# Patient Record
Sex: MALE | URBAN - METROPOLITAN AREA
[De-identification: names, ages, dates, MRNs, and addresses within clinical notes are randomized per-mention and may not be internally consistent; named-entity substitution may affect disease eponyms.]

---

## 2020-02-27 ENCOUNTER — APPOINTMENT (OUTPATIENT)
Age: 40
Setting detail: DERMATOLOGY
End: 2020-02-28

## 2020-02-27 DIAGNOSIS — D22 MELANOCYTIC NEVI: ICD-10-CM

## 2020-02-27 DIAGNOSIS — L57.8 OTHER SKIN CHANGES DUE TO CHRONIC EXPOSURE TO NONIONIZING RADIATION: ICD-10-CM

## 2020-02-27 DIAGNOSIS — L91.8 OTHER HYPERTROPHIC DISORDERS OF THE SKIN: ICD-10-CM

## 2020-02-27 DIAGNOSIS — L81.4 OTHER MELANIN HYPERPIGMENTATION: ICD-10-CM

## 2020-02-27 PROBLEM — D22.39 MELANOCYTIC NEVI OF OTHER PARTS OF FACE: Status: ACTIVE | Noted: 2020-02-27

## 2020-02-27 PROCEDURE — 99203 OFFICE O/P NEW LOW 30 MIN: CPT | Mod: 25

## 2020-02-27 PROCEDURE — OTHER COUNSELING: OTHER

## 2020-02-27 PROCEDURE — OTHER LIQUID NITROGEN: OTHER

## 2020-02-27 PROCEDURE — 11200 RMVL SKIN TAGS UP TO&INC 15: CPT

## 2020-02-27 PROCEDURE — OTHER SKIN TAG REMOVAL: OTHER

## 2020-02-27 ASSESSMENT — LOCATION ZONE DERM
LOCATION ZONE: LEG
LOCATION ZONE: FACE

## 2020-02-27 ASSESSMENT — LOCATION DETAILED DESCRIPTION DERM
LOCATION DETAILED: LEFT MEDIAL ZYGOMA
LOCATION DETAILED: RIGHT MEDIAL FOREHEAD
LOCATION DETAILED: RIGHT ANTERIOR MEDIAL PROXIMAL THIGH

## 2020-02-27 ASSESSMENT — LOCATION SIMPLE DESCRIPTION DERM
LOCATION SIMPLE: LEFT ZYGOMA
LOCATION SIMPLE: RIGHT THIGH
LOCATION SIMPLE: RIGHT FOREHEAD

## 2020-02-27 NOTE — PROCEDURE: SKIN TAG REMOVAL
Consent: Written consent obtained and the risks of skin tag removal was reviewed with the patient including but not limited to bleeding, pigmentary change, infection, pain, and remote possibility of scarring.
Anesthesia Type: 1% lidocaine with epinephrine
Medical Necessity Clause: This procedure was medically necessary because the lesions that were treated were:
Anesthesia Volume In Cc: 3
Add Associated Diagnoses If Applicable When Selecting Medical Necessity: Yes
Detail Level: Detailed
Medical Necessity Information: It is in your best interest to select a reason for this procedure from the list below. All of these items fulfill various CMS LCD requirements except the new and changing color options.
Include Z78.9 (Other Specified Conditions Influencing Health Status) As An Associated Diagnosis?: No

## 2020-02-27 NOTE — PROCEDURE: LIQUID NITROGEN
Add 52 Modifier (Optional): no
Detail Level: Zone
Number Of Freeze-Thaw Cycles: 1 freeze-thaw cycle
Duration Of Freeze Thaw-Cycle (Seconds): 5-10
Medical Necessity Information: It is in your best interest to select a reason for this procedure from the list below. All of these items fulfill various CMS LCD requirements except the new and changing color options.
Medical Necessity Clause: This procedure was medically necessary because the lesions that were treated were:
Consent: The patient's consent was obtained including but not limited to risks of crusting, scabbing, blistering, scarring, darker or lighter pigmentary change, recurrence, incomplete removal and infection.
Post-Care Instructions: I reviewed with the patient in detail post-care instructions. Patient is to wear sunprotection, and avoid picking at any of the treated lesions. Pt may apply Vaseline to crusted or scabbing areas.
Total Number Of Lesions Treated: 3

## 2023-11-22 ENCOUNTER — OFFICE VISIT (OUTPATIENT)
Dept: SURGERY | Facility: CLINIC | Age: 43
End: 2023-11-22
Payer: COMMERCIAL

## 2023-11-22 ENCOUNTER — TELEPHONE (OUTPATIENT)
Dept: SURGERY | Facility: CLINIC | Age: 43
End: 2023-11-22

## 2023-11-22 VITALS
BODY MASS INDEX: 31.14 KG/M2 | SYSTOLIC BLOOD PRESSURE: 120 MMHG | HEIGHT: 73 IN | DIASTOLIC BLOOD PRESSURE: 80 MMHG | HEART RATE: 90 BPM | WEIGHT: 235 LBS

## 2023-11-22 DIAGNOSIS — L98.8: Primary | ICD-10-CM

## 2023-11-22 PROCEDURE — 99214 OFFICE O/P EST MOD 30 MIN: CPT | Performed by: SURGERY

## 2023-11-22 NOTE — PROGRESS NOTES
"Surgery Consultation, [facility]         Parker Hare MD    Anupam Young MRN# [id number]   YOB: 1980 Age: 43 year old     PCP:  Fausto Aguilera [phone]    Chief Complaint:  Multiple lipomas    Pt was seen in consultation from Fausto Aguilera.    History of Present Illness:  Anupam Young is a 43 year old male who I actually met at my last practice at [facility].  I saw him two years ago and we planned to remove his many lipomas.  For multiple reasons he never made it to the operating room.  He has probably about 50 lipomas.  He has had them removed by dermatologists under local in the past and wasn't happy with that process.   He would like as many removed as possible    PMH:  Anupam Young   Past Medical History:   Diagnosis Date    Elevated cholesterol       PSH:  Anupam Young has never had surgery    Home medications and allergies reviewed.    Social History:  Anupam Young  reports current alcohol use. He reports that he does not use drugs.  Family History:  Anupam Young family history is not on file.    ROS:  The 10 point Review of Systems is negative other than noted in the HPI.      Physical Exam:  Blood pressure 120/80, pulse 90, height 1.854 m (6' 1\"), weight 106.6 kg (235 lb).  235 lbs 0 oz  Patient has a pleasant affect and communicates well.   Pupils equal round and reactive to light.   No cervical lymphadenopathy or thyromegaly.   Lung fields clear, breathing comfortably.   Heart normal sinus rhythm.  No murmurs rubs or gallops.  Abdomen soft, nontender, nondistended.  Skin: Multiple lipomas all down both arms, legs and anterior thorax.  Two larger ones on his posterior side: One just left of midline above his pelvis, and a second on the back of his thigh.  These are both a little tender to palpation    All new lab and imaging data was reviewed.      Assessment/plan:  Multiple lipomas.  1) He has so many lipomas this not possible to do under local as he " would need much more than the toxic dose of lidocaine.    2) The plan would be to put him lateral on his right side and remove the two posterior lipomas.  We would then position him supine, prepping the arms and remove as many as we can.  Given the number, which is probably about 30, this will take about  minutes.  And will be an outpatient procedure.  3)  I discussed this procedure with him.  I discussed the pre-operative, elier-operative and post-operative course of this procedure.  Main risk would be infection, bleeding or the wounds opening.  Another risk we discussed if one of these lipomas came back as sarcoma, given that we are removing so many it may be difficult to tell where it came from as we will have trouble coordinating the locations of the anterior lipomas with pathology.  If any look to be more solid and less fat like, we will do due diligence to know where those came from.   Surgical co-morbities include None.    Parker Hare M.D.  [facility]  [phone]    Please route or send letter to:  Primary Care Provider (PCP) and Referring Provider

## 2023-11-28 ENCOUNTER — TELEPHONE (OUTPATIENT)
Dept: SURGERY | Facility: CLINIC | Age: 43
End: 2023-11-28
Payer: COMMERCIAL

## 2023-11-28 NOTE — TELEPHONE ENCOUNTER
Name of caller: Patient    Reason for Call:  pt scheduled for multiple lipoma removal, supposed to olesya lipomas that bother him.  Dr Hare said only going for 2 big ones on back and back of leg and to olesya on front the ones that bother him.  He would like to clarify with Dr Hare, he has a lot of lipomas on the back of his legs he thought more could be taken off.    Surgeon:  Dr. Hare    Recent Surgery:  No    If yes, when & what type:  N/A      Best phone number to reach pt at is: [phone]  Ok to leave a message with medical info? Yes.    Pharmacy preferred (if calling for a refill): na

## 2023-11-29 NOTE — TELEPHONE ENCOUNTER
Discussed with him about his additional lipomas on the back of his legs.  I discussed that we were trying to avoid flipping him prone.  But if they bother him enough that he is willing to take the risk we can proceed.   I talked about pressure injuries from going prone.      He will olesya them on his legs and we will discuss things pre-operatively and decide at the time on his positioning in the operating room with anesthesias and the room RN's input.    Parker Hare M.D., F.A.C.S.  [facility]  [facility]  [place], [place]

## 2023-12-05 ENCOUNTER — ANESTHESIA (OUTPATIENT)
Dept: SURGERY | Facility: CLINIC | Age: 43
End: 2023-12-05
Payer: COMMERCIAL

## 2023-12-05 ENCOUNTER — APPOINTMENT (OUTPATIENT)
Dept: SURGERY | Facility: PHYSICIAN GROUP | Age: 43
End: 2023-12-05
Payer: COMMERCIAL

## 2023-12-05 ENCOUNTER — ANESTHESIA EVENT (OUTPATIENT)
Dept: SURGERY | Facility: CLINIC | Age: 43
End: 2023-12-05
Payer: COMMERCIAL

## 2023-12-05 ENCOUNTER — HOSPITAL ENCOUNTER (OUTPATIENT)
Facility: CLINIC | Age: 43
Discharge: HOME | End: 2023-12-05
Attending: SURGERY | Admitting: SURGERY
Payer: COMMERCIAL

## 2023-12-05 VITALS
OXYGEN SATURATION: 95 % | SYSTOLIC BLOOD PRESSURE: 154 MMHG | BODY MASS INDEX: 34.96 KG/M2 | DIASTOLIC BLOOD PRESSURE: 96 MMHG | HEIGHT: 73 IN | RESPIRATION RATE: 16 BRPM | WEIGHT: 263.8 LBS | TEMPERATURE: 97.9 F | HEART RATE: 74 BPM

## 2023-12-05 DIAGNOSIS — G89.18 ACUTE POST-OPERATIVE PAIN: Primary | ICD-10-CM

## 2023-12-05 DIAGNOSIS — L98.8: ICD-10-CM

## 2023-12-05 PROCEDURE — 88304 TISSUE EXAM BY PATHOLOGIST: CPT | Mod: TC | Performed by: SURGERY

## 2023-12-05 PROCEDURE — 710N000012 HC RECOVERY PHASE 2, PER MINUTE: Performed by: SURGERY

## 2023-12-05 PROCEDURE — 250N000009 HC RX 250: Performed by: SURGERY

## 2023-12-05 PROCEDURE — 27337 EXC THIGH/KNEE LES SC 3 CM/>: CPT | Mod: RT | Performed by: SURGERY

## 2023-12-05 PROCEDURE — 22903 EXC ABD LES SC 3 CM/>: CPT | Mod: 51 | Performed by: SURGERY

## 2023-12-05 PROCEDURE — 250N000011 HC RX IP 250 OP 636: Performed by: SURGERY

## 2023-12-05 PROCEDURE — 250N000009 HC RX 250: Performed by: ANESTHESIOLOGY

## 2023-12-05 PROCEDURE — 21930 EXC BACK LES SC < 3 CM: CPT | Mod: 59 | Performed by: SURGERY

## 2023-12-05 PROCEDURE — 88304 TISSUE EXAM BY PATHOLOGIST: CPT | Mod: 26 | Performed by: PATHOLOGY

## 2023-12-05 PROCEDURE — 22902 EXC ABD LES SC < 3 CM: CPT | Mod: AS | Performed by: PHYSICIAN ASSISTANT

## 2023-12-05 PROCEDURE — 25071 EXC FOREARM LES SC 3 CM/>: CPT | Mod: AS | Performed by: PHYSICIAN ASSISTANT

## 2023-12-05 PROCEDURE — 250N000011 HC RX IP 250 OP 636: Performed by: ANESTHESIOLOGY

## 2023-12-05 PROCEDURE — 27327 EXC THIGH/KNEE LES SC < 3 CM: CPT | Mod: RT | Performed by: SURGERY

## 2023-12-05 PROCEDURE — 21931 EXC BACK LES SC 3 CM/>: CPT | Mod: 51 | Performed by: SURGERY

## 2023-12-05 PROCEDURE — 272N000001 HC OR GENERAL SUPPLY STERILE: Performed by: SURGERY

## 2023-12-05 PROCEDURE — 258N000003 HC RX IP 258 OP 636: Performed by: ANESTHESIOLOGY

## 2023-12-05 PROCEDURE — 250N000025 HC SEVOFLURANE, PER MIN: Performed by: SURGERY

## 2023-12-05 PROCEDURE — 22902 EXC ABD LES SC < 3 CM: CPT | Mod: 59 | Performed by: SURGERY

## 2023-12-05 PROCEDURE — 25075 EXC FOREARM LES SC < 3 CM: CPT | Mod: RT | Performed by: SURGERY

## 2023-12-05 PROCEDURE — 999N000141 HC STATISTIC PRE-PROCEDURE NURSING ASSESSMENT: Performed by: SURGERY

## 2023-12-05 PROCEDURE — 22903 EXC ABD LES SC 3 CM/>: CPT | Mod: AS | Performed by: PHYSICIAN ASSISTANT

## 2023-12-05 PROCEDURE — 250N000011 HC RX IP 250 OP 636: Mod: JZ | Performed by: ANESTHESIOLOGY

## 2023-12-05 PROCEDURE — 24075 EXC ARM/ELBOW LES SC < 3 CM: CPT | Mod: RT | Performed by: SURGERY

## 2023-12-05 PROCEDURE — 258N000003 HC RX IP 258 OP 636: Performed by: NURSE ANESTHETIST, CERTIFIED REGISTERED

## 2023-12-05 PROCEDURE — 27337 EXC THIGH/KNEE LES SC 3 CM/>: CPT | Mod: AS | Performed by: PHYSICIAN ASSISTANT

## 2023-12-05 PROCEDURE — 25071 EXC FOREARM LES SC 3 CM/>: CPT | Mod: LT | Performed by: SURGERY

## 2023-12-05 PROCEDURE — 360N000075 HC SURGERY LEVEL 2, PER MIN: Performed by: SURGERY

## 2023-12-05 PROCEDURE — 370N000017 HC ANESTHESIA TECHNICAL FEE, PER MIN: Performed by: SURGERY

## 2023-12-05 PROCEDURE — 710N000010 HC RECOVERY PHASE 1, LEVEL 2, PER MIN: Performed by: SURGERY

## 2023-12-05 PROCEDURE — 27043 EXC HIP PELVIS LES SC 3 CM/>: CPT | Mod: RT | Performed by: SURGERY

## 2023-12-05 PROCEDURE — 21931 EXC BACK LES SC 3 CM/>: CPT | Mod: AS | Performed by: PHYSICIAN ASSISTANT

## 2023-12-05 PROCEDURE — 250N000013 HC RX MED GY IP 250 OP 250 PS 637: Performed by: PHYSICIAN ASSISTANT

## 2023-12-05 RX ORDER — PROPOFOL 10 MG/ML
INJECTION, EMULSION INTRAVENOUS PRN
Status: DISCONTINUED | OUTPATIENT
Start: 2023-12-05 | End: 2023-12-05

## 2023-12-05 RX ORDER — SODIUM CHLORIDE, SODIUM LACTATE, POTASSIUM CHLORIDE, CALCIUM CHLORIDE 600; 310; 30; 20 MG/100ML; MG/100ML; MG/100ML; MG/100ML
INJECTION, SOLUTION INTRAVENOUS CONTINUOUS PRN
Status: DISCONTINUED | OUTPATIENT
Start: 2023-12-05 | End: 2023-12-05

## 2023-12-05 RX ORDER — PROPOFOL 10 MG/ML
INJECTION, EMULSION INTRAVENOUS CONTINUOUS PRN
Status: DISCONTINUED | OUTPATIENT
Start: 2023-12-05 | End: 2023-12-05

## 2023-12-05 RX ORDER — SODIUM CHLORIDE, SODIUM LACTATE, POTASSIUM CHLORIDE, CALCIUM CHLORIDE 600; 310; 30; 20 MG/100ML; MG/100ML; MG/100ML; MG/100ML
INJECTION, SOLUTION INTRAVENOUS CONTINUOUS
Status: DISCONTINUED | OUTPATIENT
Start: 2023-12-05 | End: 2023-12-05 | Stop reason: HOSPADM

## 2023-12-05 RX ORDER — HYDROMORPHONE HCL IN WATER/PF 6 MG/30 ML
0.4 PATIENT CONTROLLED ANALGESIA SYRINGE INTRAVENOUS EVERY 5 MIN PRN
Status: DISCONTINUED | OUTPATIENT
Start: 2023-12-05 | End: 2023-12-05 | Stop reason: HOSPADM

## 2023-12-05 RX ORDER — DEXMEDETOMIDINE HYDROCHLORIDE 4 UG/ML
INJECTION, SOLUTION INTRAVENOUS PRN
Status: DISCONTINUED | OUTPATIENT
Start: 2023-12-05 | End: 2023-12-05

## 2023-12-05 RX ORDER — BUPIVACAINE HYDROCHLORIDE AND EPINEPHRINE 2.5; 5 MG/ML; UG/ML
INJECTION, SOLUTION INFILTRATION; PERINEURAL PRN
Status: DISCONTINUED | OUTPATIENT
Start: 2023-12-05 | End: 2023-12-05 | Stop reason: HOSPADM

## 2023-12-05 RX ORDER — FENTANYL CITRATE 50 UG/ML
INJECTION, SOLUTION INTRAMUSCULAR; INTRAVENOUS PRN
Status: DISCONTINUED | OUTPATIENT
Start: 2023-12-05 | End: 2023-12-05

## 2023-12-05 RX ORDER — FENTANYL CITRATE 0.05 MG/ML
50 INJECTION, SOLUTION INTRAMUSCULAR; INTRAVENOUS EVERY 5 MIN PRN
Status: DISCONTINUED | OUTPATIENT
Start: 2023-12-05 | End: 2023-12-05 | Stop reason: HOSPADM

## 2023-12-05 RX ORDER — ONDANSETRON 4 MG/1
4 TABLET, ORALLY DISINTEGRATING ORAL EVERY 30 MIN PRN
Status: DISCONTINUED | OUTPATIENT
Start: 2023-12-05 | End: 2023-12-05 | Stop reason: HOSPADM

## 2023-12-05 RX ORDER — OXYCODONE HYDROCHLORIDE 5 MG/1
5 TABLET ORAL
Status: COMPLETED | OUTPATIENT
Start: 2023-12-05 | End: 2023-12-05

## 2023-12-05 RX ORDER — BUPIVACAINE HYDROCHLORIDE 2.5 MG/ML
INJECTION, SOLUTION EPIDURAL; INFILTRATION; INTRACAUDAL
Status: DISCONTINUED
Start: 2023-12-05 | End: 2023-12-05 | Stop reason: HOSPADM

## 2023-12-05 RX ORDER — DEXAMETHASONE SODIUM PHOSPHATE 4 MG/ML
INJECTION, SOLUTION INTRA-ARTICULAR; INTRALESIONAL; INTRAMUSCULAR; INTRAVENOUS; SOFT TISSUE PRN
Status: DISCONTINUED | OUTPATIENT
Start: 2023-12-05 | End: 2023-12-05

## 2023-12-05 RX ORDER — HYDROMORPHONE HCL IN WATER/PF 6 MG/30 ML
0.2 PATIENT CONTROLLED ANALGESIA SYRINGE INTRAVENOUS EVERY 5 MIN PRN
Status: DISCONTINUED | OUTPATIENT
Start: 2023-12-05 | End: 2023-12-05 | Stop reason: HOSPADM

## 2023-12-05 RX ORDER — MAGNESIUM HYDROXIDE 1200 MG/15ML
LIQUID ORAL PRN
Status: DISCONTINUED | OUTPATIENT
Start: 2023-12-05 | End: 2023-12-05 | Stop reason: HOSPADM

## 2023-12-05 RX ORDER — FENTANYL CITRATE 0.05 MG/ML
25 INJECTION, SOLUTION INTRAMUSCULAR; INTRAVENOUS EVERY 5 MIN PRN
Status: DISCONTINUED | OUTPATIENT
Start: 2023-12-05 | End: 2023-12-05 | Stop reason: HOSPADM

## 2023-12-05 RX ORDER — ONDANSETRON 2 MG/ML
4 INJECTION INTRAMUSCULAR; INTRAVENOUS EVERY 30 MIN PRN
Status: DISCONTINUED | OUTPATIENT
Start: 2023-12-05 | End: 2023-12-05 | Stop reason: HOSPADM

## 2023-12-05 RX ORDER — CEFAZOLIN SODIUM/WATER 2 G/20 ML
2 SYRINGE (ML) INTRAVENOUS
Status: COMPLETED | OUTPATIENT
Start: 2023-12-05 | End: 2023-12-05

## 2023-12-05 RX ORDER — CEFAZOLIN SODIUM/WATER 2 G/20 ML
2 SYRINGE (ML) INTRAVENOUS SEE ADMIN INSTRUCTIONS
Status: DISCONTINUED | OUTPATIENT
Start: 2023-12-05 | End: 2023-12-05 | Stop reason: HOSPADM

## 2023-12-05 RX ORDER — AMOXICILLIN 250 MG
1-2 CAPSULE ORAL 2 TIMES DAILY
Qty: 15 TABLET | Refills: 0 | Status: SHIPPED | OUTPATIENT
Start: 2023-12-05 | End: 2023-12-21

## 2023-12-05 RX ORDER — ONDANSETRON 2 MG/ML
INJECTION INTRAMUSCULAR; INTRAVENOUS PRN
Status: DISCONTINUED | OUTPATIENT
Start: 2023-12-05 | End: 2023-12-05

## 2023-12-05 RX ORDER — OXYCODONE HYDROCHLORIDE 5 MG/1
5 TABLET ORAL EVERY 4 HOURS PRN
Qty: 12 TABLET | Refills: 0 | Status: SHIPPED | OUTPATIENT
Start: 2023-12-05 | End: 2023-12-21

## 2023-12-05 RX ORDER — KETOROLAC TROMETHAMINE 30 MG/ML
INJECTION, SOLUTION INTRAMUSCULAR; INTRAVENOUS PRN
Status: DISCONTINUED | OUTPATIENT
Start: 2023-12-05 | End: 2023-12-05

## 2023-12-05 RX ORDER — LIDOCAINE HYDROCHLORIDE 20 MG/ML
INJECTION, SOLUTION INFILTRATION; PERINEURAL PRN
Status: DISCONTINUED | OUTPATIENT
Start: 2023-12-05 | End: 2023-12-05

## 2023-12-05 RX ORDER — EPINEPHRINE 1 MG/ML
INJECTION, SOLUTION INTRAMUSCULAR; SUBCUTANEOUS
Status: DISCONTINUED
Start: 2023-12-05 | End: 2023-12-05 | Stop reason: HOSPADM

## 2023-12-05 RX ADMIN — Medication 2 G: at 08:17

## 2023-12-05 RX ADMIN — DEXAMETHASONE SODIUM PHOSPHATE 4 MG: 4 INJECTION, SOLUTION INTRA-ARTICULAR; INTRALESIONAL; INTRAMUSCULAR; INTRAVENOUS; SOFT TISSUE at 08:30

## 2023-12-05 RX ADMIN — SODIUM CHLORIDE, POTASSIUM CHLORIDE, SODIUM LACTATE AND CALCIUM CHLORIDE: 600; 310; 30; 20 INJECTION, SOLUTION INTRAVENOUS at 10:38

## 2023-12-05 RX ADMIN — PROPOFOL 40 MCG/KG/MIN: 10 INJECTION, EMULSION INTRAVENOUS at 08:42

## 2023-12-05 RX ADMIN — DEXMEDETOMIDINE HYDROCHLORIDE 8 MCG: 200 INJECTION INTRAVENOUS at 08:37

## 2023-12-05 RX ADMIN — SUGAMMADEX 200 MG: 100 INJECTION, SOLUTION INTRAVENOUS at 10:59

## 2023-12-05 RX ADMIN — ROCURONIUM BROMIDE 50 MG: 50 INJECTION, SOLUTION INTRAVENOUS at 08:21

## 2023-12-05 RX ADMIN — LIDOCAINE HYDROCHLORIDE 60 MG: 20 INJECTION, SOLUTION INFILTRATION; PERINEURAL at 08:20

## 2023-12-05 RX ADMIN — ROCURONIUM BROMIDE 20 MG: 50 INJECTION, SOLUTION INTRAVENOUS at 08:45

## 2023-12-05 RX ADMIN — FENTANYL CITRATE 50 MCG: 50 INJECTION INTRAMUSCULAR; INTRAVENOUS at 08:52

## 2023-12-05 RX ADMIN — DEXMEDETOMIDINE HYDROCHLORIDE 8 MCG: 200 INJECTION INTRAVENOUS at 08:30

## 2023-12-05 RX ADMIN — FENTANYL CITRATE 50 MCG: 50 INJECTION, SOLUTION INTRAMUSCULAR; INTRAVENOUS at 11:32

## 2023-12-05 RX ADMIN — KETOROLAC TROMETHAMINE 30 MG: 30 INJECTION, SOLUTION INTRAMUSCULAR at 10:47

## 2023-12-05 RX ADMIN — FENTANYL CITRATE 50 MCG: 50 INJECTION INTRAMUSCULAR; INTRAVENOUS at 08:20

## 2023-12-05 RX ADMIN — MIDAZOLAM 2 MG: 1 INJECTION INTRAMUSCULAR; INTRAVENOUS at 08:14

## 2023-12-05 RX ADMIN — SODIUM CHLORIDE, POTASSIUM CHLORIDE, SODIUM LACTATE AND CALCIUM CHLORIDE: 600; 310; 30; 20 INJECTION, SOLUTION INTRAVENOUS at 08:13

## 2023-12-05 RX ADMIN — PROPOFOL 300 MG: 10 INJECTION, EMULSION INTRAVENOUS at 08:20

## 2023-12-05 RX ADMIN — OXYCODONE HYDROCHLORIDE 5 MG: 5 TABLET ORAL at 11:54

## 2023-12-05 RX ADMIN — PHENYLEPHRINE HYDROCHLORIDE 100 MCG: 10 INJECTION INTRAVENOUS at 09:53

## 2023-12-05 RX ADMIN — DEXMEDETOMIDINE HYDROCHLORIDE 4 MCG: 200 INJECTION INTRAVENOUS at 08:56

## 2023-12-05 RX ADMIN — ONDANSETRON 4 MG: 2 INJECTION INTRAMUSCULAR; INTRAVENOUS at 10:35

## 2023-12-05 ASSESSMENT — ACTIVITIES OF DAILY LIVING (ADL)
ADLS_ACUITY_SCORE: 18

## 2023-12-05 NOTE — OR NURSING
VSS, has tolerable pain per pt response. Dischare instructions reviewed w/wife, verbal understanding given to RN. Pt transferred to door 1.

## 2023-12-05 NOTE — DISCHARGE INSTRUCTIONS
[facility]  Discharge Instructions: Post-Operative Excision of Mass or Lesion    ACTIVITY  Take frequent, short walks and increase your activity gradually.    Avoid strenuous physical activity or heavy lifting greater than 15-20 lbs. for 1-2 weeks.  You may climb stairs.  You may drive without restrictions when you are not using any prescription pain medication and feel comfortable in a car.  You may return to work/school when you are comfortable without any prescription pain medication.    WOUND CARE  You may remove your outer dressing or Band-Aids and shower 48 hours after the surgery.  Pat your incisions dry and leave them open to air.  Re-apply dressing (Band-Aids or gauze/tape) as needed for comfort or drainage.  You may have steri-strips (looks like white tape) or skin glue on your incisions.  You may peel off the steri-strips 2 weeks after your surgery if they have not peeled off on their own.  If you have skin glue, it will peel up and fall off on its own.  Do not soak your incisions in a tub or pool for 2 weeks.   Do not apply any lotions, creams, or ointments to your incision(s).  A ridge under your incision(s) is normal and will gradually resolve.    DIET  Start with liquids, then gradually resume your regular diet as tolerated.   Drink plenty of liquids to stay hydrated.    PAIN  Expect some tenderness and discomfort at the incision site(s).  Use the prescribed pain medication at your discretion.  Expect gradual resolution of your pain over several days.  You may take ibuprofen with food (unless you have been told not to) or acetaminophen/Tylenol instead of or in addition to your prescribed pain medication.  However, if you are taking Norco or Percocet, do not take any additional acetaminophen/Tylenol.  Do not drink alcohol or drive while you are taking pain medications.  You may apply ice to your incisions in 20 minute intervals as needed for the next 48 hours.  After that  time, consider switching to heat if you prefer.    EXPECTATIONS  Pain medications can cause constipation.  Limit use when possible.  Take an over the counter or prescribed stool softener/stimulant, such as Colace or Senna, 1-2 times a day with plenty of water.  You may take a mild over the counter laxative, such as Miralax or a suppository, as needed.    You may discontinue these medications once you are having regular bowel movements and/or are no longer taking your narcotic pain medication.    FOLLOW UP  Follow up with Dr. Hare in 2-3 weeks. Call [phone] to schedule an appointment or if you have any concerns. We are located at [address] [place] [address].     CALL OUR OFFICE -[phone] IF YOU HAVE:   Chills or fever above 101 F.  Increased redness, warmth, or drainage at your incisions.  Significant bleeding.  Pain not relieved by your pain medication or rest.  Increasing pain after the first 48 hours.  Any other concerns or questions.                   Same Day Surgery Discharge Instructions for  Sedation and General Anesthesia     It's not unusual to feel dizzy, light-headed or faint for up to 24 hours after surgery or while taking pain medication.  If you have these symptoms: sit for a few minutes before standing and have someone assist you when you get up to walk or use the bathroom.    You should rest and relax for the next 24 hours. We recommend you make arrangements to have an adult stay with you for at least 24 hours after your discharge.  Avoid hazardous and strenuous activity.    DO NOT DRIVE any vehicle or operate mechanical equipment for 24 hours following the end of your surgery.  Even though you may feel normal, your reactions may be affected by the medication you have received.    Do not drink alcoholic beverages for 24 hours following surgery.     Slowly progress to your regular diet as you feel able. It's not unusual to feel nauseated and/or vomit after  receiving anesthesia.  If you develop these symptoms, drink clear liquids (apple juice, ginger ale, broth, 7-up, etc. ) until you feel better.  If your nausea and vomiting persists for 24 hours, please notify your surgeon.      All narcotic pain medications, along with inactivity and anesthesia, can cause constipation. Drinking plenty of liquids and increasing fiber intake will help.    For any questions of a medical nature, call your surgeon.    Do not make important decisions for 24 hours.    If you had general anesthesia, you may have a sore throat for a couple of days related to the breathing tube used during surgery.  You may use Cepacol lozenges to help with this discomfort.  If it worsens or if you develop a fever, contact your surgeon.     If you feel your pain is not well managed with the pain medications prescribed by your surgeon, please contact your surgeon's office to let them know so they can address your concerns.          Today you received Toradol, an antiinflammatory medication similar to Ibuprofen.  You should not take other antiinflammatory medication, such as Ibuprofen, Motrin, Advil, Aleve, Naprosyn, etc until 5 pm.    **If you have concerns or questions about your procedure,    please contact Dr Hare at  [phone]**            Revised December 2021

## 2023-12-05 NOTE — ANESTHESIA POSTPROCEDURE EVALUATION
Patient: Anupam Young    Procedure: Procedure(s):  Excision multiple lipomas (30+, two on back, multiple on extremities and front       Anesthesia Type:  General    Note:     Postop Pain Control: Uneventful            Sign Out: Well controlled pain   PONV: No   Neuro/Psych: Uneventful            Sign Out: Acceptable/Baseline neuro status   Airway/Respiratory: Uneventful            Sign Out: Acceptable/Baseline resp. status   CV/Hemodynamics: Uneventful            Sign Out: Acceptable CV status; No obvious hypovolemia; No obvious fluid overload   Other NRE: NONE   DID A NON-ROUTINE EVENT OCCUR?            Last vitals:  Vitals Value Taken Time   /96 12/05/23 1200   Temp 36.6  C (97.9  F) 12/05/23 1200   Pulse 71 12/05/23 1206   Resp 16 12/05/23 1206   SpO2 95 % 12/05/23 1206   Vitals shown include unfiled device data.    Electronically Signed By: Jared Sierra DO,   December 5, 2023  2:36 PM

## 2023-12-05 NOTE — BRIEF OP NOTE
[facility]    Brief Operative Note    Pre-operative diagnosis: Mass of subcutaneous tissue of multiple sites [M79.89]  Post-operative diagnosis Same as pre-operative diagnosis    Procedure: Excision multiple lipomas (30+, two on back, multiple on extremities and front, N/A - Trunk    Surgeon: Surgeon(s) and Role:     * Parker Hare MD - Primary     * Mazin Montana PA-C - Assisting     * Kathie Arechiga PA-C - Assisting     * Joanna Hudson PA-C - Assisting  Anesthesia: General   Estimated Blood Loss: 15cc    Drains: None  Specimens:   ID Type Source Tests Collected by Time Destination   1 : MID BACK LIPOMA X2 Tissue Back, Upper SURGICAL PATHOLOGY EXAM Parker Hare MD 12/5/2023  9:00 AM    2 : LEFT POSTERIOR ARM LIPOMAS X 2 Tissue Arm, Left SURGICAL PATHOLOGY EXAM Parker Hare MD 12/5/2023 10:12 AM    3 : RIGHT POSTERIOR LEG LIPOMA Tissue Leg, Right SURGICAL PATHOLOGY EXAM Parker Hare MD 12/5/2023  9:00 AM    4 : LEFT LEG POSTERIOR LIPOMAS X 2 Tissue Leg, left SURGICAL PATHOLOGY EXAM Parker Hare MD 12/5/2023  9:30 AM    5 : LEFT UPPER BUTTOCK LIPOMA Tissue Buttock, Left SURGICAL PATHOLOGY EXAM Parker Hare MD 12/5/2023  9:30 AM    6 : RIGHT ARM POSTERIOR LIPOMA Tissue Arm, Right SURGICAL PATHOLOGY EXAM Parker Hare MD 12/5/2023  9:45 AM    7 : RIGHT HIP LIPOMA Tissue Hip, Right SURGICAL PATHOLOGY EXAM Parker Hare MD 12/5/2023  9:45 AM    8 : POSTERIOR TRUNK LIPOMA X4 Tissue Back, Lower SURGICAL PATHOLOGY EXAM Parker Hare MD 12/5/2023  9:45 AM    9 : RIGHT ARM ANTERIOR LIPOMAS X4 Tissue Arm, Right SURGICAL PATHOLOGY EXAM Parker Hare MD 12/5/2023 10:31 AM    10 : LEFT ARM ANTERIOR LIPOMAS X 8 Tissue Arm, Left SURGICAL PATHOLOGY EXAM Parker Hare MD 12/5/2023 10:32 AM    11 : RIGHT ANTERIOR THIGH LIPOMA X8 Tissue Leg, Right SURGICAL PATHOLOGY EXAM Parker Hare MD 12/5/2023 10:35 AM    12 : LEFT ANTERIOR THIGH LIPOMA X11 Tissue Leg, left SURGICAL PATHOLOGY EXAM  Parker Hare MD 12/5/2023 10:35 AM    13 : front abdominal x 5 Tissue Abdomen SURGICAL PATHOLOGY EXAM Parker Hare MD 12/5/2023 10:45 AM      Findings:   Multiple (53) subcutaneous lesions excised consistent with lipomas.  Complications: None.  Implants: * No implants in log *      Mazin Montana PA-C

## 2023-12-05 NOTE — ANESTHESIA CARE TRANSFER NOTE
Patient: Anupam Young    Procedure: Procedure(s):  Excision multiple lipomas (30+, two on back, multiple on extremities and front       Diagnosis: Mass of subcutaneous tissue of multiple sites [M79.89]  Diagnosis Additional Information: No value filed.    Anesthesia Type:   General     Note:    Oropharynx: oropharynx clear of all foreign objects and spontaneously breathing  Level of Consciousness: awake  Oxygen Supplementation: room air    Independent Airway: airway patency satisfactory and stable  Dentition: dentition unchanged  Vital Signs Stable: post-procedure vital signs reviewed and stable  Report to RN Given: handoff report given  Patient transferred to: PACU    Handoff Report: Identifed the Patient, Identified the Reponsible Provider, Reviewed the pertinent medical history, Discussed the surgical course, Reviewed Intra-OP anesthesia mangement and issues during anesthesia, Set expectations for post-procedure period and Allowed opportunity for questions and acknowledgement of understanding      Vitals:  Vitals Value Taken Time   /83 12/05/23 1110   Temp     Pulse 84 12/05/23 1114   Resp 17 12/05/23 1114   SpO2 97 % 12/05/23 1116   Vitals shown include unfiled device data.    Electronically Signed By: SOY Martinez CRNA  December 5, 2023  11:17 AM

## 2023-12-05 NOTE — OP NOTE
General Surgery Operative Note    Date of surgery: 12/5/2023    PREOPERATIVE DIAGNOSIS: Multiple subcutaneous masses on all parts of the body    POSTOPERATIVE DIAGNOSIS: Multiple subcutaneous masses on all parts of the body    PROCEDURE: Removal of 53 subcutaneous masses in the following locations with size:   Right Thigh Posterior:  3 cm x 1      2.5 x 1   Right Thigh Anterior:  3.5 cm x 1      3.0 cm x 2      1 cm x 3      0.5 cm x 2   Left Thigh posterior: 3 cm x1      2 cm x1   Left Thigh Anterior: 3 cm x 3      2.5 cm x 2      2 cm x 3      1 cm x 4   Right Hip:  3.5 cm x2   Left Buttock:  2 cm x 1   Back:   3.5 cm x 2      2 cm x 2      1.5 cm x 1   Right Forearm Anterior: 2.5 cm x 1          1 cm x 1    Right Upper Arm: 1.5 x 1  Right Forearm posterior:2.5 cm x 3   Left forearm Posterior: 2.5 cm x 1      2.0 cm x 1  1.5 x 1   Left forearm Anterior:  3 cm x 3      1 cm x 2      0.5 cm x 1   Left Upper Arm:  2 cm x 1  Abdomen:  3 cm x 3      2.5 cm x 1      1.5 cm x 1            SURGEON:  Parker Hare MD    ASSISTANT:  Mazin Montana PA-C, Kathie Arechiga PA-C, and Joanna Hudsno PA-C  The PA s assistance was medically necessary to provide adequate exposure in the operating field, maintain hemostasis, cutting suture, clamping and ligating bleeding vessels, and visualization of anatomic structures throughout the surgical procedure.       ANESTHESIA:  General.    BLOOD LOSS: 30 cc    FINDINGS: Many masses on the areas of the body listed above.  All were subcutaneous masses    INDICATIONS:   Mr. Young presented with multiple subcutaneous masses. He and his wife marked all of the ones he needed removed last night.      DETAILS OF PROCEDURE: The patient was brought to the operating room per Anesthesia, placed in supine position, and intubated without difficulty. Perioperative antibiotics were administered. The patient was then positioned prone, partially jacknife.  All pressure points were adequately padded.  All  areas accessible from the back were then prepped and draped.    A timeout was done confirming the procedure and patient.    All masses were removed with the following technique.  The larger masses were injected first with 0.5 marcaine.  An incision was made following the skin lines in the location of the mass.  Masses were removed with a combination of sharp and blunt dissection.  Hemostasis was achieved with cautery if needed.  Wounds on masses 3 cm or greater in size were closed with a deep layer of 2-0 vicryl.  Skin was closed in the subcuticular layer with 4-0 monoacyl. Masses less than 3 cm in size were closed just with a skin subcuticular layer.  Steri-strips and band-aids were placed.   This was repeated for each of the masses listed above.     Once all masses from the posterior side of the patient were removed, closed and dressed, we flipped the patient supine.  All areas circled were prepped and draped.  Creative draping was used.    Once all anterior masses were removed, wounds closed and dressed the patient was awoken and transferred to the recovery room in good condition.  He tolerated the procedure well.         Parker Hare M.D., F.A.C.[facility]  [facility]  ADIA Osorio MD

## 2023-12-05 NOTE — ANESTHESIA PREPROCEDURE EVALUATION
"Anesthesia Pre-Procedure Evaluation    Patient: Anupam Young   MRN: [id number] : 1980        Procedure : Procedure(s):  Excision multiple lipomas (30+, two on back, multiple on extremities and front          Past Medical History:   Diagnosis Date    Ankylosing spondylitis (H)     Eczema     Elevated cholesterol     Lipoma     MRSA (methicillin resistant staphylococcus aureus) pneumonia (H) 2017    pos. skin      Past Surgical History:   Procedure Laterality Date    ORTHOPEDIC SURGERY Right     right ankle arthroscopy    SEPTOPLASTY, TURBINOPLASTY, COMBINED      SOFT TISSUE SURGERY      lipoma excision      No Known Allergies   Social History     Tobacco Use    Smoking status: Never    Smokeless tobacco: Never   Substance Use Topics    Alcohol use: Yes     Comment: 14 per week      Wt Readings from Last 1 Encounters:   23 119.7 kg (263 lb 12.8 oz)        Anesthesia Evaluation            ROS/MED HX  ENT/Pulmonary:  - neg pulmonary ROS  (-) sleep apnea   Neurologic:  - neg neurologic ROS     Cardiovascular:  - neg cardiovascular ROS     METS/Exercise Tolerance:     Hematologic:  - neg hematologic  ROS     Musculoskeletal: Comment: Ankylosing spondylitis      GI/Hepatic:    (-) GERD   Renal/Genitourinary:  - neg Renal ROS     Endo:  - neg endo ROS     Psychiatric/Substance Use:  - neg psychiatric ROS     Infectious Disease:  - neg infectious disease ROS     Malignancy:       Other:            Physical Exam    Airway        Mallampati: II   TM distance: > 3 FB   Neck ROM: full   Mouth opening: > 3 cm    Respiratory Devices and Support         Dental       (+) Minor Abnormalities - some fillings, tiny chips      Cardiovascular   cardiovascular exam normal          Pulmonary   pulmonary exam normal                OUTSIDE LABS:  CBC: No results found for: \"WBC\", \"HGB\", \"HCT\", \"PLT\"  BMP: No results found for: \"NA\", \"POTASSIUM\", \"CHLORIDE\", \"CO2\", \"BUN\", \"CR\", \"GLC\"  COAGS: No results found for: \"PTT\", " "\"INR\", \"FIBR\"  POC: No results found for: \"BGM\", \"HCG\", \"HCGS\"  HEPATIC: No results found for: \"ALBUMIN\", \"PROTTOTAL\", \"ALT\", \"AST\", \"GGT\", \"ALKPHOS\", \"BILITOTAL\", \"BILIDIRECT\", \"CHRISTOPHE\"  OTHER: No results found for: \"PH\", \"LACT\", \"A1C\", \"DL\", \"PHOS\", \"MAG\", \"LIPASE\", \"AMYLASE\", \"TSH\", \"T4\", \"T3\", \"CRP\", \"SED\"    Anesthesia Plan    ASA Status:  2    NPO Status:  NPO Appropriate    Anesthesia Type: General.     - Airway: ETT   Induction: Intravenous, Propofol.   Maintenance: Balanced.   Techniques and Equipment:     - Airway: Video-Laryngoscope       Consents    Anesthesia Plan(s) and associated risks, benefits, and realistic alternatives discussed. Questions answered and patient/representative(s) expressed understanding.     - Discussed:     - Discussed with:  Patient            Postoperative Care    Pain management: IV analgesics.   PONV prophylaxis: Ondansetron (or other 5HT-3), Background Propofol Infusion     Comments:               Jared Sierra, , DO    I have reviewed the pertinent notes and labs in the chart from the past 30 days and (re)examined the patient.  Any updates or changes from those notes are reflected in this note.              # Obesity: Estimated body mass index is 34.8 kg/m  as calculated from the following:    Height as of this encounter: 1.854 m (6' 1\").    Weight as of this encounter: 119.7 kg (263 lb 12.8 oz).      "

## 2023-12-05 NOTE — OR NURSING
Ok for pt to discharge without voiding, and educate pt on what to do at home if there are concerns, per Kathie

## 2023-12-05 NOTE — INTERVAL H&P NOTE
"I have reviewed the surgical (or preoperative) H&P that is linked to this encounter, and examined the patient. There are no significant changes    Clinical Conditions Present on Arrival:  Clinically Significant Risk Factors Present on Admission                  # Obesity: Estimated body mass index is 34.8 kg/m  as calculated from the following:    Height as of this encounter: 1.854 m (6' 1\").    Weight as of this encounter: 119.7 kg (263 lb 12.8 oz).       "

## 2023-12-05 NOTE — ANESTHESIA PROCEDURE NOTES
Airway       Patient location during procedure: OR       Procedure Start/Stop Times: 12/5/2023 8:23 AM  Staff -        Anesthesiologist:  Jared Sierra DO       CRNA: Kristin Ahumada APRN CRNA       Performed By: CRNA  Consent for Airway        Urgency: elective  Indications and Patient Condition       Indications for airway management: elier-procedural       Induction type:intravenous       Mask difficulty assessment: 2 - vent by mask + OA or adjuvant +/- NMBA    Final Airway Details       Final airway type: endotracheal airway       Successful airway: ETT - single and Oral  Endotracheal Airway Details        ETT size (mm): 8.0       Successful intubation technique: video laryngoscopy       VL Blade Size: Glidescope 4       Grade View of Cords: 1       Adjucts: stylet       Position: Left       Measured from: gums/teeth       Secured at (cm): 24       Bite block used: None    Post intubation assessment        Placement verified by: capnometry, equal breath sounds and chest rise        Number of attempts at approach: 1       Number of other approaches attempted: 0       Secured with: tape       Ease of procedure: easy       Dentition: Intact and Unchanged    Medication(s) Administered   Medication Administration Time: 12/5/2023 8:23 AM

## 2023-12-07 LAB
PATH REPORT.COMMENTS IMP SPEC: NORMAL
PATH REPORT.COMMENTS IMP SPEC: NORMAL
PATH REPORT.FINAL DX SPEC: NORMAL
PATH REPORT.GROSS SPEC: NORMAL
PATH REPORT.MICROSCOPIC SPEC OTHER STN: NORMAL
PATH REPORT.RELEVANT HX SPEC: NORMAL
PHOTO IMAGE: NORMAL

## 2023-12-21 ENCOUNTER — OFFICE VISIT (OUTPATIENT)
Dept: SURGERY | Facility: CLINIC | Age: 43
End: 2023-12-21
Payer: COMMERCIAL

## 2023-12-21 ENCOUNTER — TELEPHONE (OUTPATIENT)
Dept: SURGERY | Facility: CLINIC | Age: 43
End: 2023-12-21

## 2023-12-21 DIAGNOSIS — Z09 SURGERY FOLLOW-UP EXAMINATION: Primary | ICD-10-CM

## 2023-12-21 DIAGNOSIS — T14.8XXA WOUND INFECTION: ICD-10-CM

## 2023-12-21 DIAGNOSIS — L08.9 WOUND INFECTION: ICD-10-CM

## 2023-12-21 PROCEDURE — 99024 POSTOP FOLLOW-UP VISIT: CPT | Performed by: SURGERY

## 2023-12-21 NOTE — TELEPHONE ENCOUNTER
Per Dr Hare, patient should come see him today    Scheduled patient to see Dr Hare at 1:45    Cesilia Silva, RN-BSN

## 2023-12-21 NOTE — TELEPHONE ENCOUNTER
Name of caller: Patient    Reason for Call:  symptoms  One of the incisions is infected. Patient would like to treat himself at home,wondering if he needs a prescription? Or how should he treat?    Surgeon:  Payam    Recent Surgery:  Yes.    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 Yes.    Pharmacy preferred (if calling for a refill):   [facility] in [place]

## 2023-12-21 NOTE — TELEPHONE ENCOUNTER
"Procedure: removal of 53 subcutaneous masses    Date: 12/5/23    Surgeon: Payam    Patient calling reporting that he thinks one of his incisions is infected.  Skin is raised. Painful to the touch.  First started a couple of days ago.  He reports he pushed on it yesterday and \"pus came out\"  Same thing again, today.    He put peroxide on it and washed it \"really good with soap\"    No fever.    Located on the front of his right leg long term between his knee and his waist    Patient will send photo via confidential means    I will discuss with Dr. Hare or PA team and call patient back with plan after reviewing symptoms and photo    He is in agreement with this plan and will wait for call back    Pharmacy:  [facility] in [place]          "

## 2023-12-21 NOTE — NURSING NOTE
The following medication was given:     MEDICATION: Lidocaine HCL 1% and epinephrine 1:100,000 injection  LOT #: [id number]  NDC #:  71654-780-19  :  Metaversum  EXPIRATION DATE:  04/25    Kristina [place] MA

## 2023-12-25 ENCOUNTER — TELEPHONE (OUTPATIENT)
Dept: SURGERY | Facility: CLINIC | Age: 43
End: 2023-12-25
Payer: COMMERCIAL

## 2023-12-25 DIAGNOSIS — L08.9 WOUND INFECTION: Primary | ICD-10-CM

## 2023-12-25 DIAGNOSIS — T14.8XXA WOUND INFECTION: Primary | ICD-10-CM

## 2023-12-25 RX ORDER — CEPHALEXIN 500 MG/1
500 CAPSULE ORAL 2 TIMES DAILY
Qty: 14 CAPSULE | Refills: 0 | Status: SHIPPED | OUTPATIENT
Start: 2023-12-25

## 2023-12-25 NOTE — TELEPHONE ENCOUNTER
Patient call.  He's in [place].  A separate wound on the side of his abdomen got infected.  Its red.  No fever.  Pain better today.  He will email a picture.      He is back on Thursday night.     Will start Keflex for 7 days.  If worse will try to get him in when he returns.    If it starts to get fluctuant or gets a fever he should go to an ED.

## (undated) DEVICE — NDL 22GA 1.5"

## (undated) DEVICE — DECANTER BAG 2002S

## (undated) DEVICE — SPONGE RAY-TEC 4X4" 7317

## (undated) DEVICE — NDL 19GA 1.5"

## (undated) DEVICE — SYR 10ML SLIP TIP W/O NDL

## (undated) DEVICE — SOL WATER IRRIG 1000ML BOTTLE 2F7114

## (undated) DEVICE — PREP CHLORAPREP 26ML TINTED HI-LITE ORANGE 930815

## (undated) DEVICE — SU MONOCRYL 4-0 PS-2 18" UND Y496G

## (undated) DEVICE — SU VICRYL 3-0 SH 27" J316H

## (undated) DEVICE — GLOVE BIOGEL PI MICRO SZ 8.0 48580

## (undated) DEVICE — GOWN IMPERVIOUS SPECIALTY XLG/XLONG 32474

## (undated) DEVICE — DRSG GAUZE 4X4" 3033

## (undated) DEVICE — SPONGE RAY-TEC 4X8" 7318

## (undated) DEVICE — ESU PENCIL W/SMOKE EVAC NEPTUNE STRYKER 0703-046-000

## (undated) DEVICE — BLADE KNIFE SURG 15 371115

## (undated) DEVICE — SOL NACL 0.9% IRRIG 1000ML BOTTLE 2F7124

## (undated) DEVICE — LINEN TOWEL PACK X5 5464

## (undated) DEVICE — DRAPE BREAST/CHEST 29420

## (undated) DEVICE — GLOVE BIOGEL PI MICRO INDICATOR UNDERGLOVE SZ 8.0 48980

## (undated) DEVICE — DECANTER VIAL 2006S

## (undated) DEVICE — PEN MARKING SKIN FINE 31145942

## (undated) DEVICE — ESU ELEC BLADE 2.75" COATED/INSULATED E1455

## (undated) DEVICE — PACK MINOR SBA15MIFSE

## (undated) DEVICE — DRSG STERI STRIP 1/2X4" R1547

## (undated) RX ORDER — PROPOFOL 10 MG/ML
INJECTION, EMULSION INTRAVENOUS
Status: DISPENSED
Start: 2023-12-05

## (undated) RX ORDER — DEXAMETHASONE SODIUM PHOSPHATE 4 MG/ML
INJECTION, SOLUTION INTRA-ARTICULAR; INTRALESIONAL; INTRAMUSCULAR; INTRAVENOUS; SOFT TISSUE
Status: DISPENSED
Start: 2023-12-05

## (undated) RX ORDER — FENTANYL CITRATE 50 UG/ML
INJECTION, SOLUTION INTRAMUSCULAR; INTRAVENOUS
Status: DISPENSED
Start: 2023-12-05

## (undated) RX ORDER — GLYCOPYRROLATE 0.2 MG/ML
INJECTION, SOLUTION INTRAMUSCULAR; INTRAVENOUS
Status: DISPENSED
Start: 2023-12-05

## (undated) RX ORDER — OXYCODONE HYDROCHLORIDE 5 MG/1
TABLET ORAL
Status: DISPENSED
Start: 2023-12-05

## (undated) RX ORDER — ONDANSETRON 2 MG/ML
INJECTION INTRAMUSCULAR; INTRAVENOUS
Status: DISPENSED
Start: 2023-12-05

## (undated) RX ORDER — FENTANYL CITRATE 0.05 MG/ML
INJECTION, SOLUTION INTRAMUSCULAR; INTRAVENOUS
Status: DISPENSED
Start: 2023-12-05

## (undated) RX ORDER — CEFAZOLIN SODIUM/WATER 2 G/20 ML
SYRINGE (ML) INTRAVENOUS
Status: DISPENSED
Start: 2023-12-05